# Patient Record
Sex: FEMALE | Race: WHITE | NOT HISPANIC OR LATINO | Employment: FULL TIME | URBAN - METROPOLITAN AREA
[De-identification: names, ages, dates, MRNs, and addresses within clinical notes are randomized per-mention and may not be internally consistent; named-entity substitution may affect disease eponyms.]

---

## 2022-08-02 ENCOUNTER — TRANSFERRED RECORDS (OUTPATIENT)
Dept: HEALTH INFORMATION MANAGEMENT | Facility: CLINIC | Age: 62
End: 2022-08-02

## 2023-01-27 ENCOUNTER — TRANSFERRED RECORDS (OUTPATIENT)
Dept: HEALTH INFORMATION MANAGEMENT | Facility: CLINIC | Age: 63
End: 2023-01-27

## 2023-02-06 ENCOUNTER — HOSPITAL ENCOUNTER (OUTPATIENT)
Dept: CT IMAGING | Facility: CLINIC | Age: 63
Discharge: HOME | End: 2023-02-06
Attending: INTERNAL MEDICINE | Admitting: INTERNAL MEDICINE

## 2023-02-06 DIAGNOSIS — Z13.9 ENCOUNTER FOR SCREENING: ICD-10-CM

## 2023-02-06 LAB
CV CALCIUM SCORE AGATSTON LM: 0
CV CALCIUM SCORING AGATSON LAD: 559
CV CALCIUM SCORING AGATSTON CX: 0
CV CALCIUM SCORING AGATSTON RCA: 40
CV CALCIUM SCORING AGATSTON TOTAL: 599

## 2023-02-06 PROCEDURE — 75571 CT HRT W/O DYE W/CA TEST: CPT | Mod: 26 | Performed by: INTERNAL MEDICINE

## 2023-02-06 PROCEDURE — 75571 CT HRT W/O DYE W/CA TEST: CPT

## 2023-02-14 ENCOUNTER — TRANSFERRED RECORDS (OUTPATIENT)
Dept: HEALTH INFORMATION MANAGEMENT | Facility: CLINIC | Age: 63
End: 2023-02-14

## 2023-07-06 ENCOUNTER — TRANSFERRED RECORDS (OUTPATIENT)
Dept: HEALTH INFORMATION MANAGEMENT | Facility: CLINIC | Age: 63
End: 2023-07-06

## 2023-12-16 ENCOUNTER — TRANSFERRED RECORDS (OUTPATIENT)
Dept: HEALTH INFORMATION MANAGEMENT | Facility: CLINIC | Age: 63
End: 2023-12-16

## 2024-01-17 ENCOUNTER — MEDICAL CORRESPONDENCE (OUTPATIENT)
Dept: HEALTH INFORMATION MANAGEMENT | Facility: CLINIC | Age: 64
End: 2024-01-17
Payer: COMMERCIAL

## 2024-01-23 ENCOUNTER — OFFICE VISIT (OUTPATIENT)
Dept: CARDIOLOGY | Facility: CLINIC | Age: 64
End: 2024-01-23
Payer: COMMERCIAL

## 2024-01-23 VITALS — DIASTOLIC BLOOD PRESSURE: 84 MMHG | HEART RATE: 76 BPM | SYSTOLIC BLOOD PRESSURE: 134 MMHG | WEIGHT: 233.8 LBS

## 2024-01-23 DIAGNOSIS — I10 ESSENTIAL HYPERTENSION: ICD-10-CM

## 2024-01-23 DIAGNOSIS — I44.7 LBBB (LEFT BUNDLE BRANCH BLOCK): ICD-10-CM

## 2024-01-23 DIAGNOSIS — I25.10 CORONARY ARTERY DISEASE INVOLVING NATIVE CORONARY ARTERY OF NATIVE HEART, UNSPECIFIED WHETHER ANGINA PRESENT: Primary | ICD-10-CM

## 2024-01-23 DIAGNOSIS — E78.00 HYPERCHOLESTEROLEMIA: ICD-10-CM

## 2024-01-23 PROCEDURE — 99204 OFFICE O/P NEW MOD 45 MIN: CPT | Performed by: INTERNAL MEDICINE

## 2024-01-23 RX ORDER — LOSARTAN POTASSIUM 100 MG/1
100 TABLET ORAL DAILY
COMMUNITY
Start: 2022-11-29

## 2024-01-23 RX ORDER — LEVOTHYROXINE SODIUM 125 UG/1
125 TABLET ORAL DAILY
COMMUNITY

## 2024-01-23 RX ORDER — HYDROXYZINE HYDROCHLORIDE 25 MG/1
25 TABLET, FILM COATED ORAL EVERY 6 HOURS PRN
COMMUNITY
Start: 2023-12-16 | End: 2024-12-17

## 2024-01-23 RX ORDER — IBUPROFEN 200 MG
400 TABLET ORAL EVERY 6 HOURS PRN
COMMUNITY

## 2024-01-23 RX ORDER — ASPIRIN 81 MG/1
81 TABLET, CHEWABLE ORAL DAILY
COMMUNITY
Start: 2023-02-22

## 2024-01-23 RX ORDER — 1.1% SODIUM FLUORIDE PRESCRIPTION DENTAL CREAM 5 MG/G
CREAM DENTAL AT BEDTIME
COMMUNITY
Start: 2023-05-13

## 2024-01-23 RX ORDER — DIMENHYDRINATE 50 MG
1 TABLET ORAL DAILY
COMMUNITY

## 2024-01-23 RX ORDER — ATENOLOL 50 MG/1
50 TABLET ORAL DAILY
COMMUNITY

## 2024-01-23 RX ORDER — AMPICILLIN TRIHYDRATE 250 MG
2 CAPSULE ORAL DAILY
COMMUNITY

## 2024-01-23 RX ORDER — DIAZEPAM 5 MG
5 TABLET ORAL ONCE
COMMUNITY

## 2024-01-23 RX ORDER — SENNOSIDES 8.6 MG
2 CAPSULE ORAL EVERY 8 HOURS PRN
COMMUNITY

## 2024-01-23 RX ORDER — ATORVASTATIN CALCIUM 10 MG/1
5 TABLET, FILM COATED ORAL
COMMUNITY
Start: 2023-12-16

## 2024-01-23 RX ORDER — AMOXICILLIN 500 MG/1
500 CAPSULE ORAL ONCE
COMMUNITY
Start: 2023-04-03

## 2024-01-23 RX ORDER — ACETYLCARNITINE HCL 100 %
1 POWDER (GRAM) MISCELLANEOUS DAILY
COMMUNITY

## 2024-01-23 NOTE — PATIENT INSTRUCTIONS
Continue current medications  Plan to check lipids in 1 month. Our goal is to get your LDL below 70 to slow progression of the plaque that is present in your heart arteries.  Notify us if any change in symptoms such as chest, upper back, arm discomfort with activity, shortness of breath.  Follow up in 1 year normal

## 2024-01-23 NOTE — LETTER
1/23/2024    JAZMIN NEGRON MD  [facility] [address]  [facility] [address]    RE: Denise Mcgovern       Dear Colleague,     I had the pleasure of seeing Denise Mcgovern in the [facility].  Preventive Cardiology Visit                 Thank you, Dr. Jazmin Negron, for asking the [facility] team to see Ms. Denise Mcgovern to follow-up on hypercholesterolemia, coronary artery disease, left bundle branch block.    Assessment/Recommendations   Assessment:    1.  Coronary artery disease as evidenced by elevated coronary artery calcium score of 599 done 1 year ago with the majority being in the left anterior descending.  At that time, she was being seen by cardiology at [facility] who did recommend a nuclear stress test which showed no evidence of ischemia or infarction.  She reports no decline in activity level over the course of the last year and specifically denies any exertional chest discomfort or dyspnea.  At this point would favor continued observation.  We did talk about the importance of her notifying us if any of those symptoms develop.  2.  Left bundle branch block, chronic  3.  Hypercholesterolemia, currently on very low-dose atorvastatin.  I did recommend a repeat lipid profile in 1 more month.  If her LDL is above 70, I would favor either increasing her atorvastatin to 10 mg 3 days a week or continue on 5 mg but have her take it daily.  Again our goal is to get her LDL below 70.  4.  Essential hypertension, controlled    Plan:  1.  Continue current medications  2.  Discussed importance of achieving an LDL less than 70 to slow the progression of her coronary artery disease.  If she is unable to tolerate statin therapy, would consider PCSK9 inhibitor.  3.  Continue to monitor for any new symptoms of exertional chest discomfort or dyspnea, decline in exercise capacity.  If those symptoms develop, would pursue stress testing.  4.  Follow-up with me in 1  year       History of Present Illness    Ms. Denise Mcgovern is a 64 year old female with grade disease by virtue of an elevated coronary artery calcium score of 599 with the majority in the left anterior descending, chronic left bundle branch block, hypercholesterolemia and essential hypertension who presents to cardiology today to establish ongoing care.  Had previously been seen by cardiologist at [facility] 1 year ago  For above finding and given the left bundle branch block, it was advised that she undergo nuclear stress test.  This demonstrated no evidence of ischemia or infarction and thus medical management and risk factor modification was pursued.  Since then, she denies any change in her activity level.  Specifically denies any exertional chest, upper back, arm discomfort with activity or shortness of breath.  No symptoms of palpitations, lightheadedness or near syncope.      ECG (personally reviewed): No ECG today    Cardiac Imaging Studies (personally reviewed): No new imaging     Physical Examination Review of Systems   /84 (BP Location: Left arm, Patient Position: Sitting, Cuff Size: Adult Large)   Pulse 76   Wt 106.1 kg (233 lb 12.8 oz)   There is no height or weight on file to calculate BMI.  Wt Readings from Last 3 Encounters:   01/23/24 106.1 kg (233 lb 12.8 oz)   12/09/13 106.5 kg (234 lb 12.8 oz)     General Appearance:   Awake, Alert, No acute distress.   HEENT:  No scleral icterus; the mucous membranes were pink and moist.   Neck: No cervical bruits or jugular venous distention    Chest: The spine was straight. The chest was symmetric.   Lungs:   Respirations unlabored; the lungs are clear to auscultation. No wheezing   Cardiovascular:   Regular rate and rhythm.  S1, S2 normal.  No murmur or gallop   Abdomen:  No organomegaly, masses, bruits, or tenderness. Bowels sounds are present   Extremities: No peripheral edema bilaterally   Skin: No xanthelasma. Warm, Dry.    Musculoskeletal: No tenderness.   Neurologic: Mood and affect are appropriate.    Enc Vitals  BP: 134/84  Pulse: 76  Weight: 106.1 kg (233 lb 12.8 oz)                                         Medical History  Surgical History Family History Social History   Past Medical History:   Diagnosis Date    Coronary artery disease     Coronary artery calcification    Hyperlipidemia     Hypertension     Left bundle branch block     No past surgical history on file. Family History   Problem Relation Age of Onset    Kidney failure Mother     Coronary Artery Disease Father 71        CABG    Coronary Artery Disease Brother 48        MI    Cerebrovascular Disease Daughter     Social History     Socioeconomic History    Marital status:      Spouse name: Not on file    Number of children: Not on file    Years of education: Not on file    Highest education level: Not on file   Occupational History    Not on file   Tobacco Use    Smoking status: Former     Packs/day: 1.00     Years: 9.00     Additional pack years: 0.00     Total pack years: 9.00     Types: Cigarettes     Quit date:      Years since quittin.0    Smokeless tobacco: Never   Substance and Sexual Activity    Alcohol use: Yes     Comment: occasional    Drug use: Not on file    Sexual activity: Not on file   Other Topics Concern    Not on file   Social History Narrative    Not on file     Social Determinants of Health     Financial Resource Strain: Not on file   Food Insecurity: Not on file   Transportation Needs: Not on file   Physical Activity: Not on file   Stress: Not on file   Social Connections: Not on file   Interpersonal Safety: Not on file   Housing Stability: Not on file          Medications  Allergies   Current Outpatient Medications   Medication Sig Dispense Refill    acetaminophen (TYLENOL) 650 MG CR tablet Take 2 tablets by mouth every 8 hours as needed for mild pain      Acetylcarnitine HCl (ACETYL-L-CARNITINE HCL) POWD Take 1 Dose by mouth  "daily      Alpha-Lipoic Acid 50 MG CAPS Take 1 capsule by mouth daily      amoxicillin (AMOXIL) 500 MG capsule Take 500 mg by mouth once 4 tablets one hour before dental appt      aspirin (ASA) 81 MG chewable tablet Take 81 mg by mouth daily      aspirin-acetaminophen-caffeine (EXCEDRIN MIGRAINE) 250-250-65 MG tablet Take 1 tablet by mouth every 6 hours as needed for headaches      atenolol (TENORMIN) 50 MG tablet Take 50 mg by mouth daily      atorvastatin (LIPITOR) 10 MG tablet Take 5 mg by mouth three times a week Monday, Wednesday, and /Fridays      cinnamon 500 MG CAPS Take 2 capsules by mouth daily      diazepam (VALIUM) 5 MG tablet Take 5 mg by mouth once Before procedures      Ginger, Zingiber officinalis, (GINGER PO) Take 1 capsule by mouth daily      hydrOXYzine HCl (ATARAX) 25 MG tablet Take 25 mg by mouth every 6 hours as needed      ibuprofen (ADVIL/MOTRIN) 200 MG tablet Take 400 mg by mouth every 6 hours as needed for mild pain      levothyroxine (SYNTHROID/LEVOTHROID) 125 MCG tablet Take 125 mcg by mouth daily      losartan (COZAAR) 100 MG tablet Take 100 mg by mouth daily      SF 5000 PLUS 1.1 % CREA Take by mouth at bedtime Pea sized brush for 2 minutes then spit out do not rinse      TURMERIC PO Take 1 capsule by mouth daily        Allergies   Allergen Reactions    Epinephrine      Other Reaction(s): Tachycardia..    Lovastatin Other (See Comments)     Other Reaction(s): Weakness    Weakness         Lab Results    Chemistry/lipid CBC Cardiac Enzymes/BNP/TSH/INR   No results for input(s): \"TRIG\", \"CHOLHDL\", \"LDL\", \"CREATININE\", \"BUN\", \"NA\", \"CO2\" in the last 10557 hours.    Invalid input(s): \"TOTALCHOL\", \"LDLCALC\", \"K\", \"CL\" No results for input(s): \"WBC\", \"HGB\", \"HCT\", \"MCV\", \"PLT\" in the last 01926 hours. No results for input(s): \"CKTOTAL\", \"CKMB\", \"TROPONINI\", \"BNP\", \"TSH\", \"INR\" in the last 76070 hours.    Invalid input(s): \"CKMBINDEX\"     A total of 45 minutes was spent reviewing patient's " medical records, obtaining history and performing examination, as well as discussing diagnoses/ recommendations with patient and answering all questions.                    Thank you for allowing me to participate in the care of your patient.      Sincerely,     Samina Senior MD     [facility]  cc:   No referring provider defined for this encounter.

## 2024-01-23 NOTE — PROGRESS NOTES
Preventive Cardiology Visit                 Thank you, Dr. Imani Negron, for asking the [facility] team to see Ms. Denise Mcgovern to follow-up on hypercholesterolemia, coronary artery disease, left bundle branch block.    Assessment/Recommendations   Assessment:    1.  Coronary artery disease as evidenced by elevated coronary artery calcium score of 599 done 1 year ago with the majority being in the left anterior descending.  At that time, she was being seen by cardiology at [facility] who did recommend a nuclear stress test which showed no evidence of ischemia or infarction.  She reports no decline in activity level over the course of the last year and specifically denies any exertional chest discomfort or dyspnea.  At this point would favor continued observation.  We did talk about the importance of her notifying us if any of those symptoms develop.  2.  Left bundle branch block, chronic  3.  Hypercholesterolemia, currently on very low-dose atorvastatin.  I did recommend a repeat lipid profile in 1 more month.  If her LDL is above 70, I would favor either increasing her atorvastatin to 10 mg 3 days a week or continue on 5 mg but have her take it daily.  Again our goal is to get her LDL below 70.  4.  Essential hypertension, controlled    Plan:  1.  Continue current medications  2.  Discussed importance of achieving an LDL less than 70 to slow the progression of her coronary artery disease.  If she is unable to tolerate statin therapy, would consider PCSK9 inhibitor.  3.  Continue to monitor for any new symptoms of exertional chest discomfort or dyspnea, decline in exercise capacity.  If those symptoms develop, would pursue stress testing.  4.  Follow-up with me in 1 year       History of Present Illness    Ms. Denise Mcgovern is a 64 year old female with grade disease by virtue of an elevated coronary artery calcium score of 599 with the majority in the left anterior descending,  chronic left bundle branch block, hypercholesterolemia and essential hypertension who presents to cardiology today to establish ongoing care.  Had previously been seen by cardiologist at [facility] 1 year ago  For above finding and given the left bundle branch block, it was advised that she undergo nuclear stress test.  This demonstrated no evidence of ischemia or infarction and thus medical management and risk factor modification was pursued.  Since then, she denies any change in her activity level.  Specifically denies any exertional chest, upper back, arm discomfort with activity or shortness of breath.  No symptoms of palpitations, lightheadedness or near syncope.      ECG (personally reviewed): No ECG today    Cardiac Imaging Studies (personally reviewed): No new imaging     Physical Examination Review of Systems   /84 (BP Location: Left arm, Patient Position: Sitting, Cuff Size: Adult Large)   Pulse 76   Wt 106.1 kg (233 lb 12.8 oz)   There is no height or weight on file to calculate BMI.  Wt Readings from Last 3 Encounters:   01/23/24 106.1 kg (233 lb 12.8 oz)   12/09/13 106.5 kg (234 lb 12.8 oz)     General Appearance:   Awake, Alert, No acute distress.   HEENT:  No scleral icterus; the mucous membranes were pink and moist.   Neck: No cervical bruits or jugular venous distention    Chest: The spine was straight. The chest was symmetric.   Lungs:   Respirations unlabored; the lungs are clear to auscultation. No wheezing   Cardiovascular:   Regular rate and rhythm.  S1, S2 normal.  No murmur or gallop   Abdomen:  No organomegaly, masses, bruits, or tenderness. Bowels sounds are present   Extremities: No peripheral edema bilaterally   Skin: No xanthelasma. Warm, Dry.   Musculoskeletal: No tenderness.   Neurologic: Mood and affect are appropriate.    Enc Vitals  BP: 134/84  Pulse: 76  Weight: 106.1 kg (233 lb 12.8 oz)                                         Medical History  Surgical History Family  History Social History   Past Medical History:   Diagnosis Date    Coronary artery disease     Coronary artery calcification    Hyperlipidemia     Hypertension     Left bundle branch block     No past surgical history on file. Family History   Problem Relation Age of Onset    Kidney failure Mother     Coronary Artery Disease Father 71        CABG    Coronary Artery Disease Brother 48        MI    Cerebrovascular Disease Daughter     Social History     Socioeconomic History    Marital status:      Spouse name: Not on file    Number of children: Not on file    Years of education: Not on file    Highest education level: Not on file   Occupational History    Not on file   Tobacco Use    Smoking status: Former     Packs/day: 1.00     Years: 9.00     Additional pack years: 0.00     Total pack years: 9.00     Types: Cigarettes     Quit date:      Years since quittin.0    Smokeless tobacco: Never   Substance and Sexual Activity    Alcohol use: Yes     Comment: occasional    Drug use: Not on file    Sexual activity: Not on file   Other Topics Concern    Not on file   Social History Narrative    Not on file     Social Determinants of Health     Financial Resource Strain: Not on file   Food Insecurity: Not on file   Transportation Needs: Not on file   Physical Activity: Not on file   Stress: Not on file   Social Connections: Not on file   Interpersonal Safety: Not on file   Housing Stability: Not on file          Medications  Allergies   Current Outpatient Medications   Medication Sig Dispense Refill    acetaminophen (TYLENOL) 650 MG CR tablet Take 2 tablets by mouth every 8 hours as needed for mild pain      Acetylcarnitine HCl (ACETYL-L-CARNITINE HCL) POWD Take 1 Dose by mouth daily      Alpha-Lipoic Acid 50 MG CAPS Take 1 capsule by mouth daily      amoxicillin (AMOXIL) 500 MG capsule Take 500 mg by mouth once 4 tablets one hour before dental appt      aspirin (ASA) 81 MG chewable tablet Take 81 mg by mouth  "daily      aspirin-acetaminophen-caffeine (EXCEDRIN MIGRAINE) 250-250-65 MG tablet Take 1 tablet by mouth every 6 hours as needed for headaches      atenolol (TENORMIN) 50 MG tablet Take 50 mg by mouth daily      atorvastatin (LIPITOR) 10 MG tablet Take 5 mg by mouth three times a week Monday, Wednesday, and /Fridays      cinnamon 500 MG CAPS Take 2 capsules by mouth daily      diazepam (VALIUM) 5 MG tablet Take 5 mg by mouth once Before procedures      Ginger, Zingiber officinalis, (GINGER PO) Take 1 capsule by mouth daily      hydrOXYzine HCl (ATARAX) 25 MG tablet Take 25 mg by mouth every 6 hours as needed      ibuprofen (ADVIL/MOTRIN) 200 MG tablet Take 400 mg by mouth every 6 hours as needed for mild pain      levothyroxine (SYNTHROID/LEVOTHROID) 125 MCG tablet Take 125 mcg by mouth daily      losartan (COZAAR) 100 MG tablet Take 100 mg by mouth daily      SF 5000 PLUS 1.1 % CREA Take by mouth at bedtime Pea sized brush for 2 minutes then spit out do not rinse      TURMERIC PO Take 1 capsule by mouth daily        Allergies   Allergen Reactions    Epinephrine      Other Reaction(s): Tachycardia..    Lovastatin Other (See Comments)     Other Reaction(s): Weakness    Weakness         Lab Results    Chemistry/lipid CBC Cardiac Enzymes/BNP/TSH/INR   No results for input(s): \"TRIG\", \"CHOLHDL\", \"LDL\", \"CREATININE\", \"BUN\", \"NA\", \"CO2\" in the last 67008 hours.    Invalid input(s): \"TOTALCHOL\", \"LDLCALC\", \"K\", \"CL\" No results for input(s): \"WBC\", \"HGB\", \"HCT\", \"MCV\", \"PLT\" in the last 61053 hours. No results for input(s): \"CKTOTAL\", \"CKMB\", \"TROPONINI\", \"BNP\", \"TSH\", \"INR\" in the last 10392 hours.    Invalid input(s): \"CKMBINDEX\"     A total of 45 minutes was spent reviewing patient's medical records, obtaining history and performing examination, as well as discussing diagnoses/ recommendations with patient and answering all questions.                      "